# Patient Record
Sex: FEMALE | Race: OTHER | URBAN - METROPOLITAN AREA
[De-identification: names, ages, dates, MRNs, and addresses within clinical notes are randomized per-mention and may not be internally consistent; named-entity substitution may affect disease eponyms.]

---

## 2021-02-23 ENCOUNTER — EMERGENCY (EMERGENCY)
Facility: HOSPITAL | Age: 71
LOS: 0 days | Discharge: HOME | End: 2021-02-23
Attending: EMERGENCY MEDICINE | Admitting: EMERGENCY MEDICINE
Payer: MEDICARE

## 2021-02-23 VITALS
DIASTOLIC BLOOD PRESSURE: 79 MMHG | HEIGHT: 56 IN | OXYGEN SATURATION: 99 % | RESPIRATION RATE: 22 BRPM | TEMPERATURE: 98 F | WEIGHT: 121.03 LBS | HEART RATE: 65 BPM | SYSTOLIC BLOOD PRESSURE: 189 MMHG

## 2021-02-23 VITALS
HEART RATE: 70 BPM | SYSTOLIC BLOOD PRESSURE: 153 MMHG | OXYGEN SATURATION: 100 % | TEMPERATURE: 98 F | RESPIRATION RATE: 17 BRPM | DIASTOLIC BLOOD PRESSURE: 83 MMHG

## 2021-02-23 DIAGNOSIS — Z90.710 ACQUIRED ABSENCE OF BOTH CERVIX AND UTERUS: ICD-10-CM

## 2021-02-23 DIAGNOSIS — Z88.5 ALLERGY STATUS TO NARCOTIC AGENT: ICD-10-CM

## 2021-02-23 DIAGNOSIS — Z86.59 PERSONAL HISTORY OF OTHER MENTAL AND BEHAVIORAL DISORDERS: ICD-10-CM

## 2021-02-23 DIAGNOSIS — N94.2 VAGINISMUS: ICD-10-CM

## 2021-02-23 DIAGNOSIS — Z88.8 ALLERGY STATUS TO OTHER DRUGS, MEDICAMENTS AND BIOLOGICAL SUBSTANCES: ICD-10-CM

## 2021-02-23 DIAGNOSIS — Z87.39 PERSONAL HISTORY OF OTHER DISEASES OF THE MUSCULOSKELETAL SYSTEM AND CONNECTIVE TISSUE: ICD-10-CM

## 2021-02-23 DIAGNOSIS — Z88.0 ALLERGY STATUS TO PENICILLIN: ICD-10-CM

## 2021-02-23 LAB
ALBUMIN SERPL ELPH-MCNC: 4.5 G/DL — SIGNIFICANT CHANGE UP (ref 3.5–5.2)
ALP SERPL-CCNC: 69 U/L — SIGNIFICANT CHANGE UP (ref 30–115)
ALT FLD-CCNC: 32 U/L — SIGNIFICANT CHANGE UP (ref 0–41)
ANION GAP SERPL CALC-SCNC: 13 MMOL/L — SIGNIFICANT CHANGE UP (ref 7–14)
AST SERPL-CCNC: 43 U/L — HIGH (ref 0–41)
BASOPHILS # BLD AUTO: 0.1 K/UL — SIGNIFICANT CHANGE UP (ref 0–0.2)
BASOPHILS NFR BLD AUTO: 1.3 % — HIGH (ref 0–1)
BILIRUB SERPL-MCNC: 0.6 MG/DL — SIGNIFICANT CHANGE UP (ref 0.2–1.2)
BUN SERPL-MCNC: 16 MG/DL — SIGNIFICANT CHANGE UP (ref 10–20)
CALCIUM SERPL-MCNC: 9.3 MG/DL — SIGNIFICANT CHANGE UP (ref 8.5–10.1)
CHLORIDE SERPL-SCNC: 99 MMOL/L — SIGNIFICANT CHANGE UP (ref 98–110)
CO2 SERPL-SCNC: 25 MMOL/L — SIGNIFICANT CHANGE UP (ref 17–32)
CREAT SERPL-MCNC: 0.5 MG/DL — LOW (ref 0.7–1.5)
EOSINOPHIL # BLD AUTO: 0.16 K/UL — SIGNIFICANT CHANGE UP (ref 0–0.7)
EOSINOPHIL NFR BLD AUTO: 2 % — SIGNIFICANT CHANGE UP (ref 0–8)
GLUCOSE SERPL-MCNC: 112 MG/DL — HIGH (ref 70–99)
HCT VFR BLD CALC: 44.8 % — SIGNIFICANT CHANGE UP (ref 37–47)
HGB BLD-MCNC: 14.4 G/DL — SIGNIFICANT CHANGE UP (ref 12–16)
IMM GRANULOCYTES NFR BLD AUTO: 0.3 % — SIGNIFICANT CHANGE UP (ref 0.1–0.3)
LYMPHOCYTES # BLD AUTO: 2.24 K/UL — SIGNIFICANT CHANGE UP (ref 1.2–3.4)
LYMPHOCYTES # BLD AUTO: 28.4 % — SIGNIFICANT CHANGE UP (ref 20.5–51.1)
MAGNESIUM SERPL-MCNC: 2.2 MG/DL — SIGNIFICANT CHANGE UP (ref 1.8–2.4)
MCHC RBC-ENTMCNC: 29 PG — SIGNIFICANT CHANGE UP (ref 27–31)
MCHC RBC-ENTMCNC: 32.1 G/DL — SIGNIFICANT CHANGE UP (ref 32–37)
MCV RBC AUTO: 90.3 FL — SIGNIFICANT CHANGE UP (ref 81–99)
MONOCYTES # BLD AUTO: 0.43 K/UL — SIGNIFICANT CHANGE UP (ref 0.1–0.6)
MONOCYTES NFR BLD AUTO: 5.5 % — SIGNIFICANT CHANGE UP (ref 1.7–9.3)
NEUTROPHILS # BLD AUTO: 4.93 K/UL — SIGNIFICANT CHANGE UP (ref 1.4–6.5)
NEUTROPHILS NFR BLD AUTO: 62.5 % — SIGNIFICANT CHANGE UP (ref 42.2–75.2)
NRBC # BLD: 0 /100 WBCS — SIGNIFICANT CHANGE UP (ref 0–0)
PLATELET # BLD AUTO: 346 K/UL — SIGNIFICANT CHANGE UP (ref 130–400)
POTASSIUM SERPL-MCNC: SIGNIFICANT CHANGE UP MMOL/L (ref 3.5–5)
POTASSIUM SERPL-SCNC: SIGNIFICANT CHANGE UP MMOL/L (ref 3.5–5)
PROT SERPL-MCNC: 7.5 G/DL — SIGNIFICANT CHANGE UP (ref 6–8)
RBC # BLD: 4.96 M/UL — SIGNIFICANT CHANGE UP (ref 4.2–5.4)
RBC # FLD: 13.3 % — SIGNIFICANT CHANGE UP (ref 11.5–14.5)
SODIUM SERPL-SCNC: 137 MMOL/L — SIGNIFICANT CHANGE UP (ref 135–146)
WBC # BLD: 7.88 K/UL — SIGNIFICANT CHANGE UP (ref 4.8–10.8)
WBC # FLD AUTO: 7.88 K/UL — SIGNIFICANT CHANGE UP (ref 4.8–10.8)

## 2021-02-23 PROCEDURE — 99284 EMERGENCY DEPT VISIT MOD MDM: CPT

## 2021-02-23 RX ADMIN — Medication 1 MILLIGRAM(S): at 06:17

## 2021-02-23 NOTE — ED PROVIDER NOTE - PATIENT PORTAL LINK FT
You can access the FollowMyHealth Patient Portal offered by  by registering at the following website: [email or web address]. By joining [employer]’s FollowMyHealth portal, you will also be able to view your health information using other applications (apps) compatible with our system.

## 2021-02-23 NOTE — ED PROVIDER NOTE - PHYSICAL EXAMINATION
PHYSICAL EXAM:    GENERAL: Alert, appears stated age, well appearing, non-toxic intermittently writhing on stretcher, but when distracted stops.   SKIN: Warm, pink and dry. MMM.   HEAD: NC  EYE: Normal lids/conjunctiva  ENT: Normal hearing, patent oropharynx   NECK: +supple. No meningismus, or JVD  Pulm: Bilateral BS, normal resp effort, no wheezes, stridor, or retractions  CV: RRR, no M/R/G, 2+and = radial pulses  Abd: soft, non-tender, non-distended, no rebound/guarding.   Mskel: no erythema, cyanosis, edema. no calf tenderness  Neuro: AAOx3,  normal gait.

## 2021-02-23 NOTE — ED PROVIDER NOTE - OBJECTIVE STATEMENT
69 y/o F with PMH OA, anxiety/depression/fibromyalgia/SAMANTHA presents with severe constant non-radiating vaginal spasms which "feel like incomplete orgasms" x 2 hrs PTA while sleeping. +hx similar in past. 2 days ago she had n/v/d, none today. no palliating/provoking factors.   denies pain.   no abd pain/back pain.

## 2021-02-23 NOTE — ED ADULT TRIAGE NOTE - CHIEF COMPLAINT QUOTE
I'm having spasms down there a lot - patient  Patient reports having vomiting and diarrhea a few days prior, reports Hx of total hysterectomy

## 2021-02-23 NOTE — ED PROVIDER NOTE - NSFOLLOWUPINSTRUCTIONS_ED_ALL_ED_FT
Muscle Cramps and Spasms      Muscle cramps and spasms are when muscles tighten by themselves. They usually get better within minutes. Muscle cramps are painful. They are usually stronger and last longer than muscle spasms. Muscle spasms may or may not be painful. They can last a few seconds or much longer.  Cramps and spasms can affect any muscle, but they occur most often in the calf muscles of the leg. They are usually not caused by a serious problem. In many cases, the cause is not known. Some common causes include:  •Doing more physical work or exercise than your body is ready for.      •Using the muscles too much (overuse) by repeating certain movements too many times.      •Staying in a certain position for a long time.      •Playing a sport or doing an activity without preparing properly.      •Using bad form or technique while playing a sport or doing an activity.      •Not having enough water in your body (dehydration).      •Injury.       •Side effects of some medicines.       •Low levels of the salts and minerals in your blood (electrolytes), such as low potassium or calcium.        Follow these instructions at home:      Managing pain and stiffness                   •Massage, stretch, and relax the muscle. Do this for many minutes at a time.    •If told, put heat on tight or tense muscles as often as told by your doctor. Use the heat source that your doctor recommends, such as a moist heat pack or a heating pad.  •Place a towel between your skin and the heat source.      •Leave the heat on for 20–30 minutes.      •Remove the heat if your skin turns bright red. This is very important if you are not able to feel pain, heat, or cold. You may have a greater risk of getting burned.      •If told, put ice on the affected area. This may help if you are sore or have pain after a cramp or spasm.  •Put ice in a plastic bag.      •Place a towel between your skin and the bag.      •Leave the ice on for 20 minutes, 2–3 times a day.        •Try taking hot showers or baths to help relax tight muscles.      Eating and drinking     •Drink enough fluid to keep your pee (urine) pale yellow.    •Eat a healthy diet to help ensure that your muscles work well. This should include:  •Fruits and vegetables.      •Lean protein.      •Whole grains.      •Low-fat or nonfat dairy products.        General instructions     •If you are having cramps often, avoid intense exercise for several days.      •Take over-the-counter and prescription medicines only as told by your doctor.      •Watch for any changes in your symptoms.      •Keep all follow-up visits as told by your doctor. This is important.        Contact a doctor if:    •Your cramps or spasms get worse or happen more often.      •Your cramps or spasms do not get better with time.        Summary    •Muscle cramps and spasms are when muscles tighten by themselves. They usually get better within minutes.      •Cramps and spasms occur most often in the calf muscles of the leg.      •Massage, stretch, and relax the muscle. This may help the cramp or spasm go away.      •Drink enough fluid to keep your pee (urine) pale yellow.      This information is not intended to replace advice given to you by your health care provider. Make sure you discuss any questions you have with your health care provider.

## 2021-02-23 NOTE — ED PROVIDER NOTE - CLINICAL SUMMARY MEDICAL DECISION MAKING FREE TEXT BOX
Patient feeling better, labs reviewed, K hemolyzed, has normal Cr. Unclear etiology of vaginal spasm, however no indication for admission at this time. Will need follow up with GYN.

## 2021-02-23 NOTE — ED ADULT NURSE NOTE - NSIMPLEMENTINTERV_GEN_ALL_ED
Implemented All Fall Risk Interventions:  [place]or to call system. Call bell, personal items and telephone within reach. Instruct patient to call for assistance. Room bathroom lighting operational. Non-slip footwear when patient is off stretcher. Physically safe environment: no spills, clutter or unnecessary equipment. Stretcher in lowest position, wheels locked, appropriate side rails in place. Provide visual cue, wrist band, yellow gown, etc. Monitor gait and stability. Monitor for mental status changes and reorient to person, place, and time. Review medications for side effects contributing to fall risk. Reinforce activity limits and safety measures with patient and family.

## 2021-02-23 NOTE — ED PROVIDER NOTE - ATTENDING CONTRIBUTION TO CARE
71 yo F, hx of fibromyaglgia, OA, anxiety/depression here for assessment of spasms to vagina which she describes as "severe, incomplete orgasms." Episodes started about 2 hours ago while sleeping, reports she was having a dream about climbing a mountain, woke up with symptoms. Reports she had multiple episodes of vomiting and diarrhea 2 days ago, none yesterday and no longer feels nauseous.     No fever, chills, abdominal pain, dysuria. Denies any vaginal suppositories, stimulation, masturbation.     Patient is [facility].     VS normal, patient intermittently writhing however in no distress, clear lungs, RRR, soft, NT, ND abdomen.    Patient does report she was unable to take her anxiety/depresson/fibromyalgia meds due to vomiting -- unclear if this could be withdrawal? No signs of abdominal spasm, no back pain/injury.     Will check labs, UA, give small dose of ativan, reassess.

## 2021-02-23 NOTE — ED PROVIDER NOTE - NSFOLLOWUPCLINICS_GEN_ALL_ED_FT
A Family Medicine Doctor  Family Medicine  .  [place]   Phone:   Fax:   Follow Up Time: 1-3 Days    [facility]  OB/GYN  [address] [place] [address]  Phone: [phone]  Fax:   Follow Up Time: 1-3 Days

## 2021-02-23 NOTE — ED PROVIDER NOTE - PROGRESS NOTE DETAILS
DiMare: immediately after ativan sx stopped, likely not a direct result of benzos as it was immediate, could be supratentorial TYRONE: Reviewed all results and necessity for follow up. Counseled on red flags and to return for them.  Patient appears well on discharge.